# Patient Record
Sex: MALE | Race: WHITE | NOT HISPANIC OR LATINO | Employment: PART TIME | URBAN - METROPOLITAN AREA
[De-identification: names, ages, dates, MRNs, and addresses within clinical notes are randomized per-mention and may not be internally consistent; named-entity substitution may affect disease eponyms.]

---

## 2021-07-26 PROBLEM — R06.02 SHORTNESS OF BREATH: Status: ACTIVE | Noted: 2021-07-26

## 2021-07-26 PROBLEM — R05.9 COUGH: Status: ACTIVE | Noted: 2021-07-26

## 2021-07-26 PROBLEM — U07.1 COVID-19: Status: ACTIVE | Noted: 2021-07-26

## 2021-07-26 PROBLEM — J44.1 COPD WITH ACUTE EXACERBATION: Status: ACTIVE | Noted: 2021-07-26

## 2021-08-10 PROBLEM — R06.02 SHORTNESS OF BREATH: Status: RESOLVED | Noted: 2021-07-26 | Resolved: 2021-08-10

## 2021-08-10 PROBLEM — U07.1 COVID-19: Status: RESOLVED | Noted: 2021-07-26 | Resolved: 2021-08-10

## 2021-08-10 PROBLEM — Z86.16 HISTORY OF COVID-19: Status: ACTIVE | Noted: 2021-08-10

## 2021-08-10 PROBLEM — Z87.891 HISTORY OF SMOKING 30 OR MORE PACK YEARS: Status: ACTIVE | Noted: 2021-08-10

## 2021-08-10 PROBLEM — R05.9 COUGH: Status: RESOLVED | Noted: 2021-07-26 | Resolved: 2021-08-10

## 2022-08-02 DIAGNOSIS — U07.1 COVID-19 VIRUS DETECTED: ICD-10-CM

## 2023-02-24 PROBLEM — E86.9 VOLUME DEPLETION: Status: ACTIVE | Noted: 2023-02-24

## 2023-02-24 PROBLEM — R07.9 CHEST PAIN: Status: ACTIVE | Noted: 2023-02-24

## 2023-02-24 PROBLEM — F17.200 SMOKING: Status: ACTIVE | Noted: 2021-08-10

## 2023-02-24 PROBLEM — E87.6 HYPOKALEMIA: Status: ACTIVE | Noted: 2023-02-24

## 2023-02-24 PROBLEM — E44.0 MALNUTRITION OF MODERATE DEGREE: Status: ACTIVE | Noted: 2023-02-24

## 2023-02-24 PROBLEM — J96.20 ACUTE ON CHRONIC RESPIRATORY FAILURE: Status: ACTIVE | Noted: 2023-02-24

## 2023-02-24 PROBLEM — J96.01 ACUTE RESPIRATORY FAILURE WITH HYPOXIA: Status: ACTIVE | Noted: 2023-02-24

## 2023-02-24 PROBLEM — B19.20 HEPATITIS C: Status: ACTIVE | Noted: 2023-02-24

## 2023-02-24 PROBLEM — E87.20 LACTIC ACIDOSIS: Status: ACTIVE | Noted: 2023-02-24

## 2023-02-24 PROBLEM — R07.89 CHEST TIGHTNESS: Status: ACTIVE | Noted: 2023-02-24

## 2023-02-25 PROBLEM — N17.9 AKI (ACUTE KIDNEY INJURY): Status: ACTIVE | Noted: 2023-02-25

## 2023-02-28 ENCOUNTER — PATIENT OUTREACH (OUTPATIENT)
Dept: ADMINISTRATIVE | Facility: CLINIC | Age: 63
End: 2023-02-28

## 2023-02-28 NOTE — PROGRESS NOTES
C3 nurse spoke with Asael Burton for a TCC post hospital discharge follow up call. The patient has a scheduled HOSFU appointment with Loan Jaffe on 03/02/2023 @ 1100.

## 2023-03-05 PROBLEM — D18.03 HEMANGIOMA OF LIVER: Status: ACTIVE | Noted: 2023-03-05

## 2023-03-05 PROBLEM — R82.90 FOUL SMELLING URINE: Status: ACTIVE | Noted: 2023-03-05

## 2023-03-05 PROBLEM — J96.01 ACUTE RESPIRATORY FAILURE WITH HYPOXIA: Status: RESOLVED | Noted: 2023-02-24 | Resolved: 2023-03-05

## 2023-03-05 PROBLEM — N40.1 BENIGN PROSTATIC HYPERPLASIA WITH WEAK URINARY STREAM: Status: ACTIVE | Noted: 2023-03-05

## 2023-03-05 PROBLEM — E87.20 LACTIC ACIDOSIS: Status: RESOLVED | Noted: 2023-02-24 | Resolved: 2023-03-05

## 2023-03-05 PROBLEM — N48.6 PEYRONIE'S DISEASE: Status: ACTIVE | Noted: 2023-03-05

## 2023-03-05 PROBLEM — N17.9 AKI (ACUTE KIDNEY INJURY): Status: RESOLVED | Noted: 2023-02-25 | Resolved: 2023-03-05

## 2023-03-05 PROBLEM — R39.12 BENIGN PROSTATIC HYPERPLASIA WITH WEAK URINARY STREAM: Status: ACTIVE | Noted: 2023-03-05

## 2023-03-05 PROBLEM — R91.8 LUNG NODULES: Status: ACTIVE | Noted: 2023-03-05

## 2023-03-05 PROBLEM — R07.9 CHEST PAIN: Status: RESOLVED | Noted: 2023-02-24 | Resolved: 2023-03-05

## 2023-03-05 PROBLEM — M27.69: Status: ACTIVE | Noted: 2023-03-05

## 2023-03-05 PROBLEM — E87.6 HYPOKALEMIA: Status: RESOLVED | Noted: 2023-02-24 | Resolved: 2023-03-05

## 2023-03-05 PROBLEM — R07.89 CHEST TIGHTNESS: Status: RESOLVED | Noted: 2023-02-24 | Resolved: 2023-03-05

## 2023-03-05 PROBLEM — Z86.16 HISTORY OF COVID-19: Status: RESOLVED | Noted: 2021-08-10 | Resolved: 2023-03-05

## 2023-03-05 PROBLEM — E86.9 VOLUME DEPLETION: Status: RESOLVED | Noted: 2023-02-24 | Resolved: 2023-03-05

## 2023-03-05 PROBLEM — K92.1 MELENA: Status: ACTIVE | Noted: 2023-03-05

## 2023-03-05 PROBLEM — K21.00 GERD WITH ESOPHAGITIS: Status: ACTIVE | Noted: 2023-03-05

## 2023-03-05 PROBLEM — I86.1 VARICOCELE: Status: ACTIVE | Noted: 2023-03-05

## 2023-04-18 PROBLEM — K06.2 DENTURE IRRITATION: Status: ACTIVE | Noted: 2023-04-18

## 2023-08-03 ENCOUNTER — PES CALL (OUTPATIENT)
Dept: ADMINISTRATIVE | Facility: CLINIC | Age: 63
End: 2023-08-03

## 2023-11-28 PROBLEM — N28.9 RENAL INSUFFICIENCY: Status: ACTIVE | Noted: 2023-11-28

## 2023-11-28 PROBLEM — J44.1 COPD EXACERBATION: Status: ACTIVE | Noted: 2023-11-28

## 2023-11-28 PROBLEM — F17.200 NICOTINE DEPENDENCE: Status: ACTIVE | Noted: 2023-11-28

## 2023-11-28 PROBLEM — R64 CACHEXIA: Status: ACTIVE | Noted: 2023-11-28

## 2023-11-28 PROBLEM — R63.4 WEIGHT LOSS: Status: ACTIVE | Noted: 2023-11-28

## 2023-11-28 PROBLEM — R06.02 SOB (SHORTNESS OF BREATH) ON EXERTION: Status: ACTIVE | Noted: 2023-11-28

## 2023-11-29 PROBLEM — R00.1 SINUS BRADYCARDIA: Status: ACTIVE | Noted: 2023-11-29

## 2023-11-30 PROBLEM — R00.1 SINUS BRADYCARDIA: Status: RESOLVED | Noted: 2023-11-29 | Resolved: 2023-11-30

## 2023-12-04 ENCOUNTER — PATIENT OUTREACH (OUTPATIENT)
Dept: ADMINISTRATIVE | Facility: CLINIC | Age: 63
End: 2023-12-04

## 2023-12-04 NOTE — PROGRESS NOTES
C3 nurse attempted to contact patient. The following occurred:   C3 nurse attempted to contact Asael Burton  for a TCC post hospital discharge follow up call. The patient is unable to conduct the call @ this time. The patient requested a callback.    The patient has a scheduled HOSFU appointment with Loan Jaffe MD   on 12/06/2023 @ 1400. Message sent to Physician staff.

## 2024-05-30 PROBLEM — J44.1 COPD EXACERBATION: Status: RESOLVED | Noted: 2023-11-28 | Resolved: 2024-05-30

## 2024-05-30 PROBLEM — D64.9 ANEMIA: Status: ACTIVE | Noted: 2024-05-30

## 2024-09-22 PROBLEM — I10 HYPERTENSION: Status: ACTIVE | Noted: 2024-09-22

## 2024-10-17 PROBLEM — R82.90 FOUL SMELLING URINE: Status: RESOLVED | Noted: 2023-03-05 | Resolved: 2024-10-17

## 2024-10-17 PROBLEM — U07.1 COVID-19: Status: RESOLVED | Noted: 2021-07-26 | Resolved: 2024-10-17

## 2024-10-17 PROBLEM — R06.02 SHORTNESS OF BREATH: Status: RESOLVED | Noted: 2021-07-26 | Resolved: 2024-10-17

## 2024-10-17 PROBLEM — N28.9 RENAL INSUFFICIENCY: Status: RESOLVED | Noted: 2023-11-28 | Resolved: 2024-10-17

## 2024-10-17 PROBLEM — F17.200 SMOKING: Status: RESOLVED | Noted: 2021-08-10 | Resolved: 2024-10-17

## 2025-01-06 ENCOUNTER — OUTPATIENT CASE MANAGEMENT (OUTPATIENT)
Dept: ADMINISTRATIVE | Facility: OTHER | Age: 65
End: 2025-01-06

## 2025-01-06 NOTE — PROGRESS NOTES
01-06-25 Referral received from Dr. Alejandra Pierce : patient states he recently was approved for financial assistance; has issues with receiving inhalers.   (WILL) placed call to patient @ [phone] regarding this concern . Patient voiced the following : was cut from medicaid; approved for  financial assistance in June 2024; works part-time; has his own transportation; would like to get his own place close to his workplace ([employer]) ; stays with his brother sometime.  SW encouraged patient to consider the OPCM program.  Patient reports at this time he will think about the program; at present patient is focused on medication only.    [facility]  -[phone]

## 2025-01-08 ENCOUNTER — OUTPATIENT CASE MANAGEMENT (OUTPATIENT)
Dept: ADMINISTRATIVE | Facility: OTHER | Age: 65
End: 2025-01-08

## 2025-01-08 NOTE — PROGRESS NOTES
01-08-25 Call placed to Olive Leavitt  with Medication Assistance Program, who agreed to place call to Dr. Alejandra Pierce, regarding medication request.  Olive also agreed to place call to patient providing this update.  [facility]man[facility]  -[phone]

## 2025-01-09 ENCOUNTER — OUTPATIENT CASE MANAGEMENT (OUTPATIENT)
Dept: ADMINISTRATIVE | Facility: OTHER | Age: 65
End: 2025-01-09

## 2025-01-09 NOTE — PROGRESS NOTES
Called received from [employer]mi SOLIS, stating pt is needing asstiances with medication. A secure chat has been sent to the doctor to verify what medication is needed. The doctor requested the inhaler Trelegy.  A script is needed for enrollment. The script has been obtain. CW reach out to pt@ [phone]. CW explain and discussed the PAP program with pt. Pt agreed to move forward with enrollment. Pt has started a new enrollment for Trelegy. The PAP application has been submitted for review. A determination will be made in 5 to 7 days. CW will keep pt updated on outcome.       Olive Leavitt    [phone] Ph  [phone] Fax